# Patient Record
Sex: FEMALE | Race: WHITE
[De-identification: names, ages, dates, MRNs, and addresses within clinical notes are randomized per-mention and may not be internally consistent; named-entity substitution may affect disease eponyms.]

---

## 2019-10-31 ENCOUNTER — HOSPITAL ENCOUNTER (EMERGENCY)
Dept: HOSPITAL 44 - ED | Age: 69
Discharge: HOME | End: 2019-10-31
Payer: COMMERCIAL

## 2019-10-31 VITALS — SYSTOLIC BLOOD PRESSURE: 144 MMHG | DIASTOLIC BLOOD PRESSURE: 72 MMHG
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
## 2019-10-31 DIAGNOSIS — S61.212A: Primary | ICD-10-CM

## 2019-10-31 PROCEDURE — 12001 RPR S/N/AX/GEN/TRNK 2.5CM/<: CPT

## 2019-10-31 PROCEDURE — 99282 EMERGENCY DEPT VISIT SF MDM: CPT

## 2019-10-31 NOTE — ED PHYSICIAN DOCUMENTATION
General Adult





- HISTORIAN


Historian: patient





- HPI


Stated Complaint: Laceration to middle of 3rd digit, right hand, palmar side


Chief Complaint: Laceration/Recheck/Suture


Additional Information: 


69 year old female presents to the ER with laceration to the right middle 

finger; palmar side.  She states that she was slicing ham and cut the finger.  

Tetanus unknown.


Onset: minutes


Timing: still present


Severity: mild


Modifying Factors: knife cutting ham





- ROS


CONST: no problems


EYES/ENT: none


CVS/RESP: none


GI/: none


MS/SKIN/LYMPH: none


NEURO/PSYCH: denies: tingling, numbness





- PAST HX


Past History: hypertension


Other History: diabetes Type 2, other (GERD)


Immunizations: UTD.  denies: tetanus


Allergies/Adverse Reactions: 


                                    Allergies











Allergy/AdvReac Type Severity Reaction Status Date / Time


 


No Known Drug Allergies Allergy   Verified 10/31/19 21:49














Home Medications: 


                                Ambulatory Orders











 Medication  Instructions  Recorded


 


Amlodipine Besylate 5 mg PO DAILY  u2 08/29/13


 


Aspirin Ec 81 mg PO DAILY  u2 08/29/13


 


Benazepril Hcl 20 mg PO DAILY  u2 08/29/13


 


Metformin Hcl 250 mg PO BID  u2 08/29/13


 


Magnesium Oxide [Magnesium] 250 mg PO DAILY 10/31/19


 


Omeprazole 20 mg PO [id number] 10/31/19














- SOCIAL HX


Smoking History: non-smoker


Alcohol Use: none


Drug Use: none





- FAMILY HX


Family History: No





- VITAL SIGNS


Vital Signs: 





                                   Vital Signs











Temp Pulse Resp BP Pulse Ox


 


    78   18   164/89   96 


 


    10/31/19 21:28  10/31/19 21:28  10/31/19 21:28  10/31/19 21:28














- REVIEWED ASSESSMENTS


Nursing Assessment  Reviewed: Yes


Vitals Reviewed: Yes





Procedures


Wound Location: upper extremity (right middle finger)


Wound Length: 2 cm


Wound's Depth, Shape: linear


Wound Explored: no foreign body removed


Betadine Prep?: Yes


Anesthesia: 1% Lidocaine


Volume of Anesthetic: 3 cc


Wound Debrided: minimal


Wound Repaired With: sutures


Suture Size/Type: 6:0, nylon


Number of Sutures: 6


Layer Closure?: No


Sterile Dressing Applied?: Yes


Progress: 


Patient tolerated well





ED Results Lab/Radiology





- Orders


Orders: 





                                    ED Orders











 Category Date Time Status


 


 Apply/change dressing NOW Care  10/31/19 21:35 Active


 


 Cleanse with NS and Chlorhexid 1T Care  10/31/19 21:35 Active


 


 Triple Antibiotic Ointment 1T Care  10/31/19 21:35 Active


 


 Diph,Pertuss(Acell),Tet Vac/Pf [Adacel] Med  10/31/19 21:35 Discontinued





 0.5 ml IM .ONCE ONE   


 


 Lidocaine 1% 5ml [Xylocaine] Med  10/31/19 21:35 Discontinued





 50 mg IJ NOW ONE   














General Adult Physical Exam





- PHYSICAL EXAM


GENERAL APPEARANCE: no distress


EENT: eye inspection normal, ENT inspection normal, pharynx normal, JUNIOR


NECK: normal inspection, supple


RESPIRATORY: breath sounds normal


CVS: heart sounds normal


BACK: normal inspection


SKIN: warm/dry, normal color, other (2 cm lac to the right middle finger; medial

 palmar side)


EXTREMITIES: non-tender, normal range of motion


NEURO: oriented X3, CN's nml as tested, motor nml, sensation nml, mood/affect 

nml, cognition normal





Discharge


Clincal Impression: 


 Finger laceration





Referrals: 


Shelbi Rowley MD [Primary Care Provider] - 2 Days


Additional Instructions: 


Keep finger clean and dry


Apply antibiotic ointment and dressing


Watch for signs of infection; redness, swelling, drainage.


Follow up with PCP in 7-10 days to have sutures removed


Condition: Good


Disposition: 01 HOME, SELF-CARE


Decision to Admit: NO


Decision Time: 22:06